# Patient Record
Sex: FEMALE | Race: WHITE | Employment: FULL TIME | URBAN - METROPOLITAN AREA
[De-identification: names, ages, dates, MRNs, and addresses within clinical notes are randomized per-mention and may not be internally consistent; named-entity substitution may affect disease eponyms.]

---

## 2019-07-08 ENCOUNTER — HOSPITAL ENCOUNTER (OUTPATIENT)
Age: 59
Setting detail: OUTPATIENT SURGERY
Discharge: HOME | End: 2019-07-08
Attending: INTERNAL MEDICINE | Admitting: INTERNAL MEDICINE
Payer: COMMERCIAL

## 2019-07-08 VITALS
RESPIRATION RATE: 16 BRPM | HEART RATE: 62 BPM | SYSTOLIC BLOOD PRESSURE: 103 MMHG | TEMPERATURE: 97.5 F | BODY MASS INDEX: 34.81 KG/M2 | DIASTOLIC BLOOD PRESSURE: 63 MMHG | WEIGHT: 196.5 LBS | OXYGEN SATURATION: 95 %

## 2019-07-08 LAB — GLUCOSE BLD STRIP.AUTO-MCNC: 192 MG/DL (ref 70–110)

## 2019-07-08 PROCEDURE — 77030020256 HC SOL INJ NACL 0.9%  500ML: Performed by: INTERNAL MEDICINE

## 2019-07-08 PROCEDURE — G0500 MOD SEDAT ENDO SERVICE >5YRS: HCPCS | Performed by: INTERNAL MEDICINE

## 2019-07-08 PROCEDURE — 99153 MOD SED SAME PHYS/QHP EA: CPT | Performed by: INTERNAL MEDICINE

## 2019-07-08 PROCEDURE — 74011250636 HC RX REV CODE- 250/636

## 2019-07-08 PROCEDURE — 82962 GLUCOSE BLOOD TEST: CPT

## 2019-07-08 PROCEDURE — 77030013991 HC SNR POLYP ENDOSC BSC -A: Performed by: INTERNAL MEDICINE

## 2019-07-08 PROCEDURE — 88305 TISSUE EXAM BY PATHOLOGIST: CPT

## 2019-07-08 PROCEDURE — 74011250636 HC RX REV CODE- 250/636: Performed by: INTERNAL MEDICINE

## 2019-07-08 PROCEDURE — 77030032490 HC SLV COMPR SCD KNE COVD -B: Performed by: INTERNAL MEDICINE

## 2019-07-08 PROCEDURE — 76040000007: Performed by: INTERNAL MEDICINE

## 2019-07-08 RX ORDER — SODIUM CHLORIDE 0.9 % (FLUSH) 0.9 %
5-40 SYRINGE (ML) INJECTION AS NEEDED
Status: DISCONTINUED | OUTPATIENT
Start: 2019-07-08 | End: 2019-07-08 | Stop reason: HOSPADM

## 2019-07-08 RX ORDER — LISINOPRIL 5 MG/1
TABLET ORAL DAILY
COMMUNITY

## 2019-07-08 RX ORDER — VARENICLINE TARTRATE 0.5 MG/1
0.5 TABLET, FILM COATED ORAL 2 TIMES DAILY
COMMUNITY

## 2019-07-08 RX ORDER — DEXTROMETHORPHAN/PSEUDOEPHED 2.5-7.5/.8
1.2 DROPS ORAL
Status: CANCELLED | OUTPATIENT
Start: 2019-07-08

## 2019-07-08 RX ORDER — SODIUM CHLORIDE 0.9 % (FLUSH) 0.9 %
5-40 SYRINGE (ML) INJECTION EVERY 8 HOURS
Status: DISCONTINUED | OUTPATIENT
Start: 2019-07-08 | End: 2019-07-08 | Stop reason: HOSPADM

## 2019-07-08 RX ORDER — FLUMAZENIL 0.1 MG/ML
0.2 INJECTION INTRAVENOUS
Status: DISCONTINUED | OUTPATIENT
Start: 2019-07-08 | End: 2019-07-08 | Stop reason: HOSPADM

## 2019-07-08 RX ORDER — DIPHENHYDRAMINE HYDROCHLORIDE 50 MG/ML
50 INJECTION, SOLUTION INTRAMUSCULAR; INTRAVENOUS ONCE
Status: DISCONTINUED | OUTPATIENT
Start: 2019-07-08 | End: 2019-07-08 | Stop reason: HOSPADM

## 2019-07-08 RX ORDER — FENTANYL CITRATE 50 UG/ML
100 INJECTION, SOLUTION INTRAMUSCULAR; INTRAVENOUS
Status: DISCONTINUED | OUTPATIENT
Start: 2019-07-08 | End: 2019-07-08 | Stop reason: HOSPADM

## 2019-07-08 RX ORDER — NALOXONE HYDROCHLORIDE 0.4 MG/ML
0.4 INJECTION, SOLUTION INTRAMUSCULAR; INTRAVENOUS; SUBCUTANEOUS
Status: CANCELLED | OUTPATIENT
Start: 2019-07-08 | End: 2019-07-08

## 2019-07-08 RX ORDER — ATROPINE SULFATE 0.1 MG/ML
0.5 INJECTION INTRAVENOUS
Status: CANCELLED | OUTPATIENT
Start: 2019-07-08 | End: 2019-07-09

## 2019-07-08 RX ORDER — MIDAZOLAM HYDROCHLORIDE 1 MG/ML
.25-5 INJECTION, SOLUTION INTRAMUSCULAR; INTRAVENOUS
Status: DISCONTINUED | OUTPATIENT
Start: 2019-07-08 | End: 2019-07-08 | Stop reason: HOSPADM

## 2019-07-08 RX ORDER — EPINEPHRINE 0.1 MG/ML
1 INJECTION INTRACARDIAC; INTRAVENOUS
Status: CANCELLED | OUTPATIENT
Start: 2019-07-08 | End: 2019-07-09

## 2019-07-08 RX ORDER — SODIUM CHLORIDE 9 MG/ML
1000 INJECTION, SOLUTION INTRAVENOUS CONTINUOUS
Status: DISCONTINUED | OUTPATIENT
Start: 2019-07-08 | End: 2019-07-08 | Stop reason: HOSPADM

## 2019-07-08 RX ADMIN — SODIUM CHLORIDE 1000 ML: 900 INJECTION, SOLUTION INTRAVENOUS at 08:34

## 2019-07-08 NOTE — H&P
This 59year old female presents for Colon Cancer Screening. History of Present Illness:  1. Colon Cancer Screening   Prior screening:  colonoscopy and 3/2009 Dr. Asad Díaz 0 polyps. Risk Factors: history of other malignancy, M-Breast, F-bladder and Bone. Additional information: No family history of colon cancer, Patient has a family history of Crohn's/colitis and No NSAID/ASA use. PROBLEM LIST:   Problem List reviewed. Problem Description Onset Date Chronic Clinical Status Notes   Breast lump 11/10/2014 N     High risk drug side effect 10/04/2012 Y     Type II diabetes mellitus uncontrolled 2014 Y     Pure hypercholesterolemia 2010 Y     Adjustment disorder 2010 Y     Tobacco dependence syndrome 2010 Y               PAST MEDICAL/SURGICAL HISTORY   (Detailed)    Disease/disorder Onset Date Management Date Comments     Breast biopsy       D&C           Family History  (Detailed)  Relationship Family Member Name  Age at Death Condition Onset Age Cause of Death       Family history of Hypertension  N       Family history of Heart disease  N   Family h/o    Kidney stones     Family h/o    Urolithiasis     Father  Y  Cancer, prostate  N   Maternal grandmother  N  Cancer, breast  N   Mother  N  Cancer, breast  N         Social History:  (Detailed)  Tobacco use reviewed. The patient is right-handed. Preferred language is [place]. Tobacco use status: Occasional cigarette smoker. Smoking status: Current every day smoker.     SMOKING STATUS  Use Status Type Smoking Status Usage Per Day Years Used Total Pack Years   yes Cigarette Current every day smoker 1 Packs 20.00 20.00     TOBACCO CESSATION INFORMATION  Date Counseled By Order Status Description Code Tobacco Cessation Information   2010 Sharon Thakkar Tobacco cessation counseling completed   Tobacco cessation counseling   2011 Sharon Thakkar Tobacco cessation counseling completed   Tobacco cessation counseling     ALCOHOL  There is a history of alcohol use. consumed rarely. CAFFEINE  The patient uses caffeine: coffee and soda. .            Medications (active prior to today)  Medication Instructions Start Date Stop Date Refilled Elsewhere   FreeStyle Lite Strips test daily by Subcutaneous route 03/08/2016 03/08/2016 N   cyclobenzaprine 5 mg tablet take 1 tablet by oral route 2 times every day as needed for back pain 12/13/2017 12/13/2017 N   Nicoderm CQ 21 mg/24 hr daily transdermal patch apply 1 patch by transdermal route  every day and remove at bedtime 07/17/2018 04/30/2019  N   nicotine (polacrilex) 2 mg gum chew 1 piece of gum by oral route  every 2 hours as needed and as directed 08/08/2018 04/30/2019  N   metformin ER 1,000 mg tablet,extended release 24hr take 1 tablet by oral route twice a day with  meals 11/07/2018   N   lisinopril 5 mg tablet TAKE 1 TABLET BY ORAL ROUTE EVERY DAY 11/07/2018 11/07/2018 N   aspirin 81 mg tablet,delayed release take 1 tablet by oral route  every day 11/07/2018   N   Accu-Chek Kayleigh Plus test strips test 1 x daily by Subcutaneous route 01/14/2019   N   Zoloft 100 mg tablet TAKE 1 TABLET (100MG) BY ORAL ROUTE EVERY DAY 01/23/2019 01/23/2019 N   ROSUVASTATIN CALCIUM 40 MG TAB TAKE 1 TABLET BY ORAL ROUTE EVERY DAY 01/23/2019 01/23/2019 N   Zantac 150 mg tablet TAKE 1 TABLET BY MOUTH 3 TIMES A DAY 01/31/2019 01/31/2019 N   Chantix Starting Month Box 0.5 mg (11)-1 mg (42) tablets in dose pack take as directed 04/12/2019   N   Proctosol HC 2.5 % topical cream perineal applicator apply by topical route 2 times every day to the affected area(s) 04/12/2019   N     Patient Status   Completed with information received for patient in a summary of care record. Medication Reconciliation  Medications reconciled today.   Medication Reviewed  Adherence Medication Name Sig Desc Elsewhere Status   taking as directed Zoloft 100 mg tablet TAKE 1 TABLET (100MG) BY ORAL ROUTE EVERY DAY N Verified   taking as directed Chantix Starting Month Box 0.5 mg (11)-1 mg (42) tablets in dose pack take as directed N Verified   taking as directed lisinopril 5 mg tablet TAKE 1 TABLET BY ORAL ROUTE EVERY DAY N Verified   taking as directed ROSUVASTATIN CALCIUM 40 MG TAB TAKE 1 TABLET BY ORAL ROUTE EVERY DAY N Verified   taking as directed cyclobenzaprine 5 mg tablet take 1 tablet by oral route 2 times every day as needed for back pain N Verified   taking as directed metformin ER 1,000 mg tablet,extended release 24hr take 1 tablet by oral route twice a day with  meals N Verified   taking as directed aspirin 81 mg tablet,delayed release take 1 tablet by oral route  every day N Verified   taking as directed Zantac 150 mg tablet TAKE 1 TABLET BY MOUTH 3 TIMES A DAY N Verified   taking as directed Proctosol HC 2.5 % topical cream perineal applicator apply by topical route 2 times every day to the affected area(s) N Verified   taking as directed GaviLyte-N 420 gram oral solution take as prescribed by physician N Verified   taking as directed Accu-Chek Kayleigh Plus test strips test 1 x daily by Subcutaneous route N Verified   taking as directed FreeStyle Lite Strips test daily by Subcutaneous route N Verified     Medications (Added, Continued or Stopped today)  Start Date Medication Directions PRN Status PRN Reason Instruction Stop Date   01/14/2019 Accu-Chek Kayleigh Plus test strips test 1 x daily by Subcutaneous route N  MAY USE ANY SIZE ACCU CHECK STRIPS FOR PTS GLUCOMETER    11/07/2018 aspirin 81 mg tablet,delayed release take 1 tablet by oral route  every day N      04/12/2019 Chantix Starting Month Box 0.5 mg (11)-1 mg (42) tablets in dose pack take as directed N      12/13/2017 cyclobenzaprine 5 mg tablet take 1 tablet by oral route 2 times every day as needed for back pain Y back pain     03/08/2016 FreeStyle Lite Strips test daily by Subcutaneous route N  Correct prescriber is Dr. Augie Metz    04/30/2019 GaviLyte-N 420 gram oral solution take as prescribed by physician N      11/07/2018 lisinopril 5 mg tablet TAKE 1 TABLET BY ORAL ROUTE EVERY DAY N      11/07/2018 metformin ER 1,000 mg tablet,extended release 24hr take 1 tablet by oral route twice a day with  meals N      07/17/2018 Nicoderm CQ 21 mg/24 hr daily transdermal patch apply 1 patch by transdermal route  every day and remove at bedtime N   04/30/2019 08/08/2018 nicotine (polacrilex) 2 mg gum chew 1 piece of gum by oral route  every 2 hours as needed and as directed N   04/30/2019 04/12/2019 Proctosol HC 2.5 % topical cream perineal applicator apply by topical route 2 times every day to the affected area(s) N      01/23/2019 ROSUVASTATIN CALCIUM 40 MG TAB TAKE 1 TABLET BY ORAL ROUTE EVERY DAY N      01/31/2019 Zantac 150 mg tablet TAKE 1 TABLET BY MOUTH 3 TIMES A DAY N      01/23/2019 Zoloft 100 mg tablet TAKE 1 TABLET (100MG) BY ORAL ROUTE EVERY DAY N        Allergies:  Ingredient Reaction (Severity) Medication Name Comment   AMOXICILLIN TRIHYDRATE   Amoxil   BUPROPION HCL   Wellbutrin   CODEINE      GENTAMICIN SULFATE   Garamycin   OMEPRAZOLE MAGNESIUM rash  Prilosec OTC   SULFA (SULFONAMIDE ANTIBIOTICS) rash     Reviewed, no changes. Review of Systems  System Neg/Pos Details   Constitutional Negative Chills, Fever, Malaise and Weight loss. ENMT Negative Nasal congestion and Sore throat. Eyes Negative Double vision. Respiratory Negative Asthma, Chronic cough and Wheezing. Cardio Negative Chest pain, Edema and Irregular heartbeat/palpitations. GI Positive Heartburn, Reflux. GI Negative Abdominal pain, Change in bowel habits, Constipation, Decreased appetite, Diarrhea, Dysphagia, Hematemesis, Hematochezia, Melena, Nausea and Vomiting.  Negative Dysuria and Hematuria.    Endocrine Negative Cold intolerance, Heat intolerance and Increased thirst.   Neuro Negative Dizziness, Headache, Numbness, Tremors and Vertigo. Psych Negative Anxiety, Depression and Increased stress. Integumentary Negative Hives and Rash. MS Negative Back pain, Joint pain and Myalgia. Hema/Lymph Negative Easy bleeding and Easy bruising. Allergic/Immuno Positive Seasonal allergies. Allergic/Immuno Negative Contact allergy and Food allergies. Vital Signs     Height  Time ft in cm Last Measured Height Position   1:20 PM 5.0 4.00 162.56 04/30/2019 Standing     Weight/BSA/BMI  Time lb oz kg Context BMI kg/m2 BSA m2   1:20 .00  89.811 dressed with shoes 33.99      Blood Pressure  Time BP mm/Hg Position Side Site Method Cuff Size   1:20 /70 sitting right arm automatic adult large     Temperature/Pulse/Respiration  Time Temp F Temp C Temp Site Pulse/min Pattern Resp/ min   1:20 PM    75 regular 18     Measured By  Time Measured by   1:20 PM [facility]         PHYSICAL EXAM:  Exam Findings Details   Constitutional Normal Well developed. Eyes Normal Conjunctiva - Right: Normal, Left: Normal. Sclera - Right: Normal, Left: Normal.   Nasopharynx Normal Lips/teeth/gums - Normal. Buccal mucosa - Normal.   Neck Exam Normal Inspection - Normal. Palpation - Normal. Thyroid gland - Normal.   Respiratory Normal Inspection - Normal. Auscultation - Normal.   Cardiovascular Normal Regular rate and rhythm. No murmurs, gallops, or rubs. Vascular Normal Pulses - Radial: Normal, Brachial: Normal, Dorsalis pedis: Normal, Posterior tibial: Normal.   Abdomen Normal Inspection - Normal. Appliance(s) - Normal. Abdominal muscles - Normal. Auscultation - Normal. Percussion - Normal. Anterior palpation - Normal, No guarding. Umbilicus - Normal. No abdominal tenderness. No hepatic enlargement. No spleen enlargement. No hernia. No Ascites. Beatty's sign - Negative. No hepatic tenderness. No hepatic bruit. Skin Normal Inspection - Normal.   Musculoskeletal Normal Hands/Wrist - Right: Normal, Left: Normal.   Extremity Normal No edema. Neurological Normal Fine motor skills - Normal.   Psychiatric Normal Orientation - Oriented to time, place, person & situation. Appropriate mood and affect. Assessment/Plan  # Detail Type Description    1. Assessment Encounter for screening for malignant neoplasm of skin (Z12.83). Patient Plan 59year old female patient of Dr. Gene Burnett for colonoscopy. Last colonoscopy completed 2009  by Dr. Lloyd Suarez. Impression and pathology revealed rectal hemorrhoids, a few small smooth polyps in sigmoid colon removed by cold snare polypectomy. BM once daily. Normal color, soft, formed in consistency. No evidence of blood or mucus, changes in bowel pattern or constipation issues. Patient reports seasonal and sulfa, codeine, PCN, and Gentamycin allergies but no herbal consumption. Medical hx includes DMII, HTN, hyperlipidemia, GERD, hemorrhoids. No significant  pulmonary , musculoskeletal issues. Surgical hx remarkable for appendectomy, cystectomy. No family history of colorectal CA. Reports 1-2PPD  tobacco and rare ETOH use. No significant weight gains or losses in the last 3-6 months. No heat or cold intolerances. Patient states  no N/V/D, fever, chills, sick contacts, SOB, abdominal or chest pains. No dysphagia, appetite is good which consists of 3 meals per day. PLAN: Colonoscopy Scheduled     She has average risk for colon cancer and is asymptomatic. She would be having her screening colonoscopy. I explained to her the procedure of colonoscopy and the risks involved which include but not limited to reaction to sedation, bleeding, perforation, infection or missing a lesion if bowels are not well prepped or are unusually tortuous. She agreed to proceed with the procedure and answered her questions. I gave her the [place] to clean her bowels. I advised her to take if needed extra laxatives for few days before in case she is on the constipated side to assure adequate bowel prep.    Told her not take her medications in the morning of the procedure because they would be flushed out with the prep but can take them more confidently after the procedure. I advised her to bring all her medication with her.      No change in h&P

## 2019-07-08 NOTE — PROCEDURES
[facility]  Colonoscopy Procedure Report  _______________________________________________________  Patient: Alana Eaton                                         Attending Physician: Joey Espinoza MD    Patient ID: [id number]                                      Referring Physician: Shantelle Bah DO    Exam Date: July 8, 2019 _______________________________________________________      Introduction: A  62 y.o. female patient, presents for outpatient Colonoscopy    Indications: patient of Dr. Prashant James for colonoscopy. Last colonoscopy in 2009  by my self: rectal hemorrhoids, a few small smooth polyps in sigmoid colon removed by cold snare polypectomy. BM once daily. Medical hx includes DMII, HTN, hyperlipidemia, GERD, hemorrhoids. Surgical hx remarkable for appendectomy, cystectomy. No family history of colorectal CA. Consent: The benefits, risks, and alternatives to the procedure were discussed and informed consent was obtained from the patient. Preparation: EKG, pulse, pulse oximetry and blood pressure were monitored throughout the procedure. ASA Classification: Class 2 - . The heart is an S1-S2 and regular heart rate and rhythm. Lungs are clear to auscultation and percussion. Abdomen is obese rounded soft, nondistended, and nontender. Mental Status: awake, alert, and oriented to person, place, and time    Medications:  · Fentanyl 100 mcg IV before procedure. · Versed 5 mg IV throughout the procedure. Rectal Exam: Small external hemorrhoids. Small rectocele. No Blood. Pathology Specimens: Two specimens removed. Procedure: The colonoscope was passed with ease through the anus under direct visualization and advanced to the cecum and 5 cm inside the terminal ileum. The patient required positioning on the back to aid in the passage of the scope. The scope was withdrawn and the mucosa was carefully examined. The quality of the preparation was excellent.  The views were excellent. The patient's toleration of the procedure was excellent. Retroflexion was preformed in the ascending colon and hepatic flexure. The exam was done twice to the cecum. Total time is 29 minutes and withdrawal time is 24 minutes. Findings:    Rectum:   Medium sized internal hemorrhoids  Sigmoid:   Slightly tortuous with mild sigmoid diverticulosis. 8 mm  Flat hyperplastic ? Polyp at 25 cm hot snared  Descending Colon:   Normal  Transverse Colon:   7 mm sessile polyp at the hepatic flexure cold snared  Ascending Colon:   4 mm sessile polyp at the hepatic flexure hot snared  Cecum:   Normal  Terminal Ileum:   Normal      Unplanned Events: There were no unplanned events. Estimated Blood Loss: None  Impressions:    Small external hemorrhoids. Medium sized internal hemorrhoids. Slightly tortuous with mild sigmoid diverticulosis. 8 mm  Flat hyperplastic ? Polyp at 25 cm hot snared. 7 mm sessile polyp at the hepatic flexure cold snared. 4 mm sessile polyp at the hepatic flexure hot snared. Normal Mucosa. Complications: None; patient tolerated the procedure well. Recommendations:  · Discharge home when standard parameters are met. · Resume a high fiber diet. · Colonoscopy recommendation in 5 years.     Procedure Codes:    · [facility] [[id number]]    Endoscope Information:  Model Number(s)    [id number]     Assistant: None      Signed By: Hiral Rich MD Date: July 8, 2019

## 2019-07-08 NOTE — DISCHARGE INSTRUCTIONS
Lennice Barthel  [id number]  1960    COLON DISCHARGE INSTRUCTIONS    Discomfort:  Redness at IV site- apply warm compress to area; if redness or soreness persist- contact your physician  There may be a slight amount of blood passed from the rectum  Gaseous discomfort- walking, belching will help relieve any discomfort  You may not operate a vehicle til the next day. You may not engage in an occupation involving machinery or appliances til the next day. You may not drink alcoholic beverages til the next day. DIET:   High fiber diet. ACTIVITY:  You may not  resume your normal daily activities til the next day. it is recommended that you spend the remainder of the day resting -  avoid any strenuous activity. CALL M.D.  IF ANY SIGN OF:   Increasing pain, nausea, vomiting  Abdominal distension (swelling)  New increased bleeding (oral or rectal)  Fever (chills)  Pain in chest area  Bloody discharge from nose or mouth  Shortness of breath    You may not  take any Advil, Aspirin, Ibuprofen, Motrin, Aleve, or Goodys for 5 days, ONLY  Tylenol as needed for pain. Post procedure diagnosis:  HEMORROIDS, POLYP IN TRANSVERSE, POLYP IN SIGMOID    Follow-up Instructions: Your follow up colonoscopy will be in 5 years. We will notify you the results of your biopsy by letter within 2 weeks.     Phuong Lorenz MD  July 8, 2019       DISCHARGE SUMMARY from Nurse    The following personal items collected during your admission are returned to you:   Dental Appliance:    Vision: Visual Aid: Glasses  Hearing Aid:    Jewelry:    Clothing:    Other Valuables:    Valuables sent to safe:              PATIENT INSTRUCTIONS:    After general anesthesia or intravenous sedation, for 24 hours or while taking prescription Narcotics:  · Limit your activities  · Do not drive and operate hazardous machinery  · Do not make important personal or business decisions  · Do  not drink alcoholic beverages  · If you have not urinated within 8 hours after discharge, please contact your surgeon on call. Report the following to your surgeon:  · Excessive pain, swelling, redness or odor of or around the surgical area  · Temperature over 100.5  · Nausea and vomiting lasting longer than 4 hours or if unable to take medications  · Any signs of decreased circulation or nerve impairment to extremity: change in color, persistent  numbness, tingling, coldness or increase pain  · Any questions      No orders of the defined types were placed in this encounter. What to do at Home:  Recommended activity: as above,     If you experience any of the following symptoms as above, please follow up with Dr. Trevon Murdock. *  Please give a list of your current medications to your Primary Care Provider. *  Please update this list whenever your medications are discontinued, doses are      changed, or new medications (including over-the-counter products) are added. *  Please carry medication information at all times in case of emergency situations. These are general instructions for a healthy lifestyle:    No smoking/ No tobacco products/ Avoid exposure to second hand smoke    Surgeon General's Warning:  Quitting smoking now greatly reduces serious risk to your health. Obesity, smoking, and sedentary lifestyle greatly increases your risk for illness    A healthy diet, regular physical exercise & weight monitoring are important for maintaining a healthy lifestyle    You may be retaining fluid if you have a history of heart failure or if you experience any of the following symptoms:  Weight gain of 3 pounds or more overnight or 5 pounds in a week, increased swelling in our hands or feet or shortness of breath while lying flat in bed. Please call your doctor as soon as you notice any of these symptoms; do not wait until your next office visit.     Recognize signs and symptoms of STROKE:    F-face looks uneven    A-arms unable to move or move unevenly    S-speech slurred or non-existent    T-time-call 911 as soon as signs and symptoms begin-DO NOT go       Back to bed or wait to see if you get better-TIME IS BRAIN. The discharge information has been reviewed with the patient and spouse. The patient and spouse verbalized understanding. Warning Signs of HEART ATTACK     Call 911 if you have these symptoms:   Chest discomfort. Most heart attacks involve discomfort in the center of the chest that lasts more than a few minutes, or that goes away and comes back. It can feel like uncomfortable pressure, squeezing, fullness, or pain.  Discomfort in other areas of the upper body. Symptoms can include pain or discomfort in one or both arms, the back, neck, jaw, or stomach.  Shortness of breath with or without chest discomfort.  Other signs may include breaking out in a cold sweat, nausea, or lightheadedness. Don't wait more than five minutes to call 911 - MINUTES MATTER! Fast action can save your life. Calling 911 is almost always the fastest way to get lifesaving treatment. Emergency Medical Services staff can begin treatment when they arrive -- up to an hour sooner than if someone gets to the hospital by car. The discharge information has been reviewed with the patient and caregiver. The patient and caregiver verbalized understanding. Discharge medications reviewed with the patient and guardian and appropriate educational materials and side effects teaching were provided.     Patient armband removed and shredded

## 2023-01-18 ENCOUNTER — HOSPITAL ENCOUNTER (OUTPATIENT)
Dept: LAB | Age: 63
Discharge: HOME | End: 2023-01-18
Payer: COMMERCIAL

## 2023-01-18 LAB
ALBUMIN SERPL-MCNC: 4.1 G/DL (ref 3.4–5)
ALBUMIN/GLOB SERPL: 1.2 (ref 0.8–1.7)
ALP SERPL-CCNC: 56 U/L (ref 45–117)
ALT SERPL-CCNC: 20 U/L (ref 13–56)
ANION GAP SERPL CALC-SCNC: 3 MMOL/L (ref 3–18)
AST SERPL-CCNC: 14 U/L (ref 10–38)
BASOPHILS # BLD: 0 K/UL (ref 0–0.1)
BASOPHILS NFR BLD: 1 % (ref 0–2)
BILIRUB SERPL-MCNC: 0.4 MG/DL (ref 0.2–1)
BUN SERPL-MCNC: 12 MG/DL (ref 7–18)
BUN/CREAT SERPL: 14 (ref 12–20)
CALCIUM SERPL-MCNC: 10.6 MG/DL (ref 8.5–10.1)
CHLORIDE SERPL-SCNC: 105 MMOL/L (ref 100–111)
CO2 SERPL-SCNC: 32 MMOL/L (ref 21–32)
CREAT SERPL-MCNC: 0.86 MG/DL (ref 0.6–1.3)
DIFFERENTIAL METHOD BLD: ABNORMAL
EOSINOPHIL # BLD: 0.1 K/UL (ref 0–0.4)
EOSINOPHIL NFR BLD: 2 % (ref 0–5)
ERYTHROCYTE [DISTWIDTH] IN BLOOD BY AUTOMATED COUNT: 13.3 % (ref 11.6–14.5)
GLOBULIN SER CALC-MCNC: 3.3 G/DL (ref 2–4)
GLUCOSE SERPL-MCNC: 83 MG/DL (ref 74–99)
HCT VFR BLD AUTO: 44.3 % (ref 35–45)
HGB BLD-MCNC: 14.5 G/DL (ref 12–16)
IMM GRANULOCYTES # BLD AUTO: 0 K/UL (ref 0–0.04)
IMM GRANULOCYTES NFR BLD AUTO: 0 % (ref 0–0.5)
INR PPP: 1 (ref 0.8–1.2)
LYMPHOCYTES # BLD: 1.4 K/UL (ref 0.9–3.6)
LYMPHOCYTES NFR BLD: 18 % (ref 21–52)
MCH RBC QN AUTO: 29.1 PG (ref 24–34)
MCHC RBC AUTO-ENTMCNC: 32.7 G/DL (ref 31–37)
MCV RBC AUTO: 88.8 FL (ref 78–100)
MONOCYTES # BLD: 0.4 K/UL (ref 0.05–1.2)
MONOCYTES NFR BLD: 6 % (ref 3–10)
NEUTS SEG # BLD: 5.7 K/UL (ref 1.8–8)
NEUTS SEG NFR BLD: 74 % (ref 40–73)
NRBC # BLD: 0 K/UL (ref 0–0.01)
NRBC BLD-RTO: 0 PER 100 WBC
PLATELET # BLD AUTO: 216 K/UL (ref 135–420)
PMV BLD AUTO: 10 FL (ref 9.2–11.8)
POTASSIUM SERPL-SCNC: 4.2 MMOL/L (ref 3.5–5.5)
PROT SERPL-MCNC: 7.4 G/DL (ref 6.4–8.2)
PROTHROMBIN TIME: 13.1 SEC (ref 11.5–15.2)
RBC # BLD AUTO: 4.99 M/UL (ref 4.2–5.3)
SODIUM SERPL-SCNC: 140 MMOL/L (ref 136–145)
WBC # BLD AUTO: 7.8 K/UL (ref 4.6–13.2)

## 2023-01-18 PROCEDURE — 36415 COLL VENOUS BLD VENIPUNCTURE: CPT

## 2023-01-18 PROCEDURE — 85610 PROTHROMBIN TIME: CPT

## 2023-01-18 PROCEDURE — 80053 COMPREHEN METABOLIC PANEL: CPT

## 2023-01-18 PROCEDURE — 85025 COMPLETE CBC W/AUTO DIFF WBC: CPT

## 2023-01-19 LAB
FAX TO INFO,FAXT: NORMAL
FAX TO NUMBER,FAXN: NORMAL

## 2023-01-26 RX ORDER — CYCLOSPORINE 0.5 MG/ML
1 EMULSION OPHTHALMIC EVERY 12 HOURS
Status: ON HOLD | COMMUNITY
End: 2023-01-27

## 2023-01-26 RX ORDER — CETIRIZINE HYDROCHLORIDE 10 MG/1
1 CAPSULE, LIQUID FILLED ORAL DAILY
COMMUNITY

## 2023-01-26 RX ORDER — PANTOPRAZOLE SODIUM 20 MG/1
20 TABLET, DELAYED RELEASE ORAL DAILY
Status: ON HOLD | COMMUNITY
End: 2023-01-27

## 2023-01-26 RX ORDER — FAMOTIDINE 40 MG/1
40 TABLET, FILM COATED ORAL DAILY
COMMUNITY

## 2023-01-26 RX ORDER — METOPROLOL TARTRATE 25 MG/1
12.5 TABLET, FILM COATED ORAL 2 TIMES DAILY
COMMUNITY
End: 2023-01-27

## 2023-01-26 RX ORDER — DULAGLUTIDE 0.75 MG/.5ML
0.75 INJECTION, SOLUTION SUBCUTANEOUS
COMMUNITY

## 2023-01-26 RX ORDER — RANOLAZINE 500 MG/1
TABLET, EXTENDED RELEASE ORAL 2 TIMES DAILY
COMMUNITY
End: 2023-01-27

## 2023-01-26 RX ORDER — ICOSAPENT ETHYL 1000 MG/1
1 CAPSULE ORAL 2 TIMES DAILY WITH MEALS
COMMUNITY

## 2023-01-26 RX ORDER — ASPIRIN 81 MG/1
81 TABLET ORAL DAILY
COMMUNITY

## 2023-01-26 RX ORDER — METHOCARBAMOL 500 MG/1
500 TABLET, FILM COATED ORAL
COMMUNITY

## 2023-01-27 ENCOUNTER — HOSPITAL ENCOUNTER (OUTPATIENT)
Age: 63
Setting detail: OUTPATIENT SURGERY
Discharge: HOME | End: 2023-01-27
Attending: INTERNAL MEDICINE | Admitting: INTERNAL MEDICINE
Payer: COMMERCIAL

## 2023-01-27 VITALS
HEIGHT: 63 IN | SYSTOLIC BLOOD PRESSURE: 107 MMHG | WEIGHT: 163 LBS | BODY MASS INDEX: 28.88 KG/M2 | OXYGEN SATURATION: 97 % | DIASTOLIC BLOOD PRESSURE: 57 MMHG | RESPIRATION RATE: 20 BRPM | TEMPERATURE: 99 F | HEART RATE: 68 BPM

## 2023-01-27 DIAGNOSIS — I20.9 ANGINA, CLASS III (HCC): ICD-10-CM

## 2023-01-27 DIAGNOSIS — R94.39 ABNORMAL STRESS TEST: ICD-10-CM

## 2023-01-27 LAB
GLUCOSE BLD STRIP.AUTO-MCNC: 111 MG/DL (ref 70–110)
GLUCOSE BLD STRIP.AUTO-MCNC: 51 MG/DL (ref 70–110)

## 2023-01-27 PROCEDURE — C1894 INTRO/SHEATH, NON-LASER: HCPCS | Performed by: INTERNAL MEDICINE

## 2023-01-27 PROCEDURE — 74011000250 HC RX REV CODE- 250: Performed by: INTERNAL MEDICINE

## 2023-01-27 PROCEDURE — 74011250637 HC RX REV CODE- 250/637: Performed by: INTERNAL MEDICINE

## 2023-01-27 PROCEDURE — 74011250636 HC RX REV CODE- 250/636: Performed by: INTERNAL MEDICINE

## 2023-01-27 PROCEDURE — 82962 GLUCOSE BLOOD TEST: CPT

## 2023-01-27 PROCEDURE — 77030013797 HC KT TRNSDUC PRSSR EDWD -A: Performed by: INTERNAL MEDICINE

## 2023-01-27 PROCEDURE — 77030040934 HC CATH DIAG DXTERITY MEDT -A: Performed by: INTERNAL MEDICINE

## 2023-01-27 PROCEDURE — 77030010221 HC SPLNT WR POS TELE -B: Performed by: INTERNAL MEDICINE

## 2023-01-27 PROCEDURE — 93458 L HRT ARTERY/VENTRICLE ANGIO: CPT | Performed by: INTERNAL MEDICINE

## 2023-01-27 PROCEDURE — 77030016699 HC CATH ANGI DX INFN1 CARD -A: Performed by: INTERNAL MEDICINE

## 2023-01-27 PROCEDURE — 74011000636 HC RX REV CODE- 636: Performed by: INTERNAL MEDICINE

## 2023-01-27 RX ORDER — SODIUM CHLORIDE 9 MG/ML
75 INJECTION, SOLUTION INTRAVENOUS CONTINUOUS
Status: DISCONTINUED | OUTPATIENT
Start: 2023-01-27 | End: 2023-01-27 | Stop reason: HOSPADM

## 2023-01-27 RX ORDER — METFORMIN HYDROCHLORIDE 1000 MG/1
1000 TABLET ORAL DAILY
Qty: 30 TABLET | Refills: 0 | Status: SHIPPED
Start: 2023-01-30

## 2023-01-27 RX ORDER — SODIUM CHLORIDE 0.9 % (FLUSH) 0.9 %
5-40 SYRINGE (ML) INJECTION EVERY 8 HOURS
Status: DISCONTINUED | OUTPATIENT
Start: 2023-01-27 | End: 2023-01-27 | Stop reason: HOSPADM

## 2023-01-27 RX ORDER — SODIUM CHLORIDE 0.9 % (FLUSH) 0.9 %
5-40 SYRINGE (ML) INJECTION AS NEEDED
Status: DISCONTINUED | OUTPATIENT
Start: 2023-01-27 | End: 2023-01-27 | Stop reason: HOSPADM

## 2023-01-27 RX ORDER — HEPARIN SODIUM 1000 [USP'U]/ML
INJECTION, SOLUTION INTRAVENOUS; SUBCUTANEOUS AS NEEDED
Status: DISCONTINUED | OUTPATIENT
Start: 2023-01-27 | End: 2023-01-27 | Stop reason: HOSPADM

## 2023-01-27 RX ORDER — HEPARIN SODIUM 200 [USP'U]/100ML
INJECTION, SOLUTION INTRAVENOUS
Status: COMPLETED | OUTPATIENT
Start: 2023-01-27 | End: 2023-01-27

## 2023-01-27 RX ORDER — VERAPAMIL HYDROCHLORIDE 2.5 MG/ML
INJECTION, SOLUTION INTRAVENOUS AS NEEDED
Status: DISCONTINUED | OUTPATIENT
Start: 2023-01-27 | End: 2023-01-27 | Stop reason: HOSPADM

## 2023-01-27 RX ORDER — FENTANYL CITRATE 50 UG/ML
INJECTION, SOLUTION INTRAMUSCULAR; INTRAVENOUS AS NEEDED
Status: DISCONTINUED | OUTPATIENT
Start: 2023-01-27 | End: 2023-01-27 | Stop reason: HOSPADM

## 2023-01-27 RX ORDER — THERA TABS 400 MCG
1 TAB ORAL DAILY
COMMUNITY

## 2023-01-27 RX ORDER — LIDOCAINE HYDROCHLORIDE 10 MG/ML
INJECTION INFILTRATION; PERINEURAL AS NEEDED
Status: DISCONTINUED | OUTPATIENT
Start: 2023-01-27 | End: 2023-01-27 | Stop reason: HOSPADM

## 2023-01-27 RX ORDER — GUAIFENESIN 100 MG/5ML
LIQUID (ML) ORAL AS NEEDED
Status: DISCONTINUED | OUTPATIENT
Start: 2023-01-27 | End: 2023-01-27 | Stop reason: HOSPADM

## 2023-01-27 RX ADMIN — SODIUM CHLORIDE 75 ML/HR: 9 INJECTION, SOLUTION INTRAVENOUS at 09:05

## 2023-01-27 NOTE — PROGRESS NOTES
Pt is all prepped and ready for procedure. 1000 Pt picked up for procedure to the Cath Lab    1100 Pt back to care unit S/P [facility]. Pt alert and awake and tolerated procedure well. TR band to left wrist with immobilizer in place. Site WNL. Precautions reinforced also patient and voiced understandings. 1200 Initiating removing air from TR band    1230 TR band removed and tolerated well. No bleeding nor hematoma noted to site. Sterile 2x2 with tegaderm dressing applied to site. 200 Dr Willie Barraza in to see patient. Results and follow up discussed. 1330 Discharge instructions reviewed with patient and caregiver and they verbalized all understandings. 1400 Pt escorted to car and left with family in stable condition.

## 2023-01-27 NOTE — DISCHARGE INSTRUCTIONS
Cardiac Catheterization/Angiography Discharge Instructions    *Check the puncture site frequently for swelling or bleeding. If you see any bleeding, lie down and apply pressure over the area with a clean town or washcloth. Notify your doctor for any redness, swelling, drainage or oozing from the puncture site. Notify your doctor for any fever or chills. *If the leg or arm with the puncture becomes cold, numb or painful, call Dr Amee Campbell     *Activity should be limited for the next 48 hours. Climb stairs as little as possible and avoid any stooping, bending or strenuous activity for 48 hours. No heavy lifting (anything over 10 pounds) for three days. *Do not drive for 48 hours. *You may resume your usual diet. Drink more fluids than usual.    *Have a responsible person drive you home and stay with you for at least 24 hours after your heart catheterization/angiography. *You may remove the bandage from your Left and Arm in 24 hours. You may shower in 24 hours. No tub baths, hot tubs or swimming for one week. Do not place any lotions, creams, powders, ointments over the puncture site for one week. You may place a clean band-aid over the puncture site each day for 5 days. Change this daily. DISCHARGE SUMMARY from Nurse    PATIENT INSTRUCTIONS:    After general anesthesia or intravenous sedation, for 24 hours or while taking prescription Narcotics:  Limit your activities  Do not drive and operate hazardous machinery  Do not make important personal or business decisions  Do  not drink alcoholic beverages  If you have not urinated within 8 hours after discharge, please contact your surgeon on call.     Report the following to your surgeon:  Excessive pain, swelling, redness or odor of or around the surgical area  Temperature over 100.5  Nausea and vomiting lasting longer than 4 hours or if unable to take medications  Any signs of decreased circulation or nerve impairment to extremity: change in color, persistent  numbness, tingling, coldness or increase pain  Any questions    What to do at Home:  Recommended activity: No lifting, Driving, or Strenuous exercise for one week. *  Please give a list of your current medications to your Primary Care Provider. *  Please update this list whenever your medications are discontinued, doses are      changed, or new medications (including over-the-counter products) are added. *  Please carry medication information at all times in case of emergency situations. These are general instructions for a healthy lifestyle:    No smoking/ No tobacco products/ Avoid exposure to second hand smoke  Surgeon General's Warning:  Quitting smoking now greatly reduces serious risk to your health. Obesity, smoking, and sedentary lifestyle greatly increases your risk for illness    A healthy diet, regular physical exercise & weight monitoring are important for maintaining a healthy lifestyle    You may be retaining fluid if you have a history of heart failure or if you experience any of the following symptoms:  Weight gain of 3 pounds or more overnight or 5 pounds in a week, increased swelling in our hands or feet or shortness of breath while lying flat in bed. Please call your doctor as soon as you notice any of these symptoms; do not wait until your next office visit. The discharge information has been reviewed with the patient and caregiver. The patient and caregiver verbalized understanding. Discharge medications reviewed with the patient and caregiver and appropriate educational materials and side effects teaching were provided.         Patient armband removed and shredded    ___________________________________________________________________________________________________________________________________

## 2023-01-27 NOTE — H&P
Date of Surgery Update:  Shaw Damon was seen and examined. History and physical has been reviewed. The patient has been examined. There have been no significant clinical changes since the completion of the originally dated History and Physical.      Patient assessed and is candidate for moderate sedation.       Signed By: Irma Ahmadi MD     January 27, 2023 10:22 AM

## 2023-01-27 NOTE — Clinical Note
TRANSFER - IN REPORT:     Verbal report received from: Daisy. Report consisted of patient's Situation, Background, Assessment and   Recommendations(SBAR). Opportunity for questions and clarification was provided. Assessment completed upon patient's arrival to unit and care assumed. Patient transported with a Registered Nurse.

## 2023-01-27 NOTE — PROGRESS NOTES
POC bloodsugar done from cap of IV set per protocol. Bs results 46. Pt states does not think that is right,  states feels fine,  POC blood sugar reschecked on left middle finger using fingerstick.   Repeat

## 2023-01-27 NOTE — DISCHARGE SUMMARY
Discharge Summary    Patient: Daina Castillo MRN: [id number]  CSN: [id number]    YOB: 1960  Age: 57 y.o. Sex: female    DOA: 1/27/2023 LOS:  LOS: 0 days   Discharge Date:      Primary Care Provider:  German Loredo DO    Admission Diagnoses: Angina, class III ([address] [I20.9]  Abnormal stress test [R94.39]    Discharge Diagnoses:   CAD    Discharge Condition: Stable    Discharge Medications:     Current Discharge Medication List        CONTINUE these medications which have CHANGED    Details   metFORMIN (GLUCOPHAGE) 1,000 mg tablet Take 1 Tablet by mouth daily. Indications: type 2 diabetes mellitus  Qty: 30 Tablet, Refills: 0  Start date: 1/30/2023           CONTINUE these medications which have NOT CHANGED    Details   therapeutic multivitamin (THERAGRAN) tablet Take 1 Tablet by mouth daily. aspirin delayed-release 81 mg tablet Take 81 mg by mouth daily. famotidine (PEPCID) 40 mg tablet Take 40 mg by mouth daily. dulaglutide (Trulicity) 9.72 DI/1.1 mL sub-q pen 0.75 mg by SubCUTAneous route every seven (7) days. icosapent ethyL (VASCEPA) 1 gram capsule Take 1 Capsule by mouth two (2) times daily (with meals). lisinopril (PRINIVIL, ZESTRIL) 5 mg tablet Take  by mouth daily. rosuvastatin (CRESTOR) 10 mg tablet Take 40 mg by mouth nightly. Indications: high cholesterol      sertraline (ZOLOFT) 100 mg tablet Take 100 mg by mouth nightly. cholecalciferol, vitamin D3, 2,000 unit tab Take 1 Tab by mouth nightly. methocarbamoL (ROBAXIN) 500 mg tablet Take 500 mg by mouth two (2) times daily as needed for Muscle Spasm(s). Cetirizine (ZyrTEC) 10 mg cap Take 1 Tablet by mouth daily.            STOP taking these medications       metoprolol tartrate (LOPRESSOR) 25 mg tablet Comments:   Reason for Stopping:         ranolazine ER (RANEXA) 500 mg SR tablet Comments:   Reason for Stopping:               Procedures :  left heart catheterization, coronary angiogram plus or minus PCI    Consults: None      PHYSICAL EXAM   Visit Vitals  /60 (BP 1 Location: Right upper arm, BP Patient Position: At rest;Supine)   Pulse 69   Temp 98.7 °F (37.1 °C)   Resp 13   Ht 5' 3\" (1.6 m)   Wt 73.9 kg (163 lb)   SpO2 92%   Breastfeeding No   BMI 28.87 kg/m²     General: Awake, cooperative, no acute distress    HEENT: NC, Atraumatic. PERRLA, EOMI. Anicteric sclerae. Lungs:  CTA Bilaterally. No Wheezing/Rhonchi/Rales. Heart:  Regular  rhythm,   3/6 systolic murmur in aortic area, No Rubs, No Gallops  Abdomen: Soft, Non distended, Non tender. +Bowel sounds,   Extremities: No c/c/e  Psych:   Not anxious or agitated. Neurologic:  No acute neurological deficits. Admission HPI :  see H&P    Hospital Course :  57-year-old female came for outpatient cardiac catheterization. Left heart catheterization and coronary angiogram done via left radial approach. Coronary angiogram revealed mild CAD. LVEDP was 9 mm hg.  Mild gradient on pullback. Patient tolerated procedure. Advised about medical management. Activity:   No driving for 24 hours, no weight lifting no more than 10 lb from left hand for 1 week    Diet: Cardiac Diet    Follow-up:  in cardiology clinic in 4-6 weeks    Disposition:  home    Minutes spent on discharge:  37 minutes       Labs: Results:       Chemistry No results for input(s): GLU, NA, K, CL, CO2, BUN, CREA, CA, AGAP, BUCR, TBIL, AP, TP, ALB, GLOB, AGRAT in the last 72 hours. No lab exists for component: GPT   CBC w/Diff No results for input(s): WBC, RBC, HGB, HCT, PLT, GRANS, LYMPH, EOS, HGBEXT, HCTEXT, PLTEXT in the last 72 hours. Cardiac Enzymes No results for input(s): CPK, CKND1, CHICO in the last 72 hours. No lab exists for component: CKRMB, TROIP   Coagulation No results for input(s): PTP, INR, APTT, INREXT in the last 72 hours.     Lipid Panel No results found for: CHOL, CHOLPOCT, CHOLX, CHLST, CHOLV, 269956, HDL, HDLP, LDL, LDLC, DLDLP, 389832, VLDLC, VLDL, TGLX, TRIGL, TRIGP, TGLPOCT, CHHD, CHHDX   BNP No results for input(s): BNPP in the last 72 hours. Liver Enzymes No results for input(s): TP, ALB, TBIL, AP in the last 72 hours. No lab exists for component: SGOT, GPT, DBIL   Thyroid Studies No results found for: T4, T3U, TSH, TSHEXT         Significant Diagnostic Studies: No results found.           CC: Tori Dorantes, DO

## 2023-01-27 NOTE — Clinical Note
TRANSFER - OUT REPORT:     Verbal report given to: [place]. Report consisted of patient's Situation, Background, Assessment and   Recommendations(SBAR). Opportunity for questions and clarification was provided. Patient transported with a Registered Nurse. Patient transported to: care unit.

## 2023-01-27 NOTE — Clinical Note
Contrast Dose Calculator:   Patient's age: 57.   Patient's sex: Female. Patient weight (kg) = 73.9. Creatinine level (mg/dL) = 0.86. Creatinine clearance (mL/min): 77.1. Max Contrast dose per Creatinine Cl calculator = 173.47 mL.

## 2023-01-27 NOTE — PROCEDURES
LHC and Coronary angiogram done via left radial approach. Coronary angiogram revealed mild CAD    LV gram was not done. LVEDP 9 mm hg. Mild gradient on pull back. Patient tolerated procedure well. Scant blood loss. No complications. No specimen removed. Recommendation:    Medication considered:   Aspirin, beta blocker, ACEI, Nitrates and statin     CAD risk factor education  Diet education  Control cholesterol  Blood pressure control  Exercise education: Age and functional status appropriate. Consider evaluation for other sources of reported symptoms.

## 2025-03-11 ENCOUNTER — HOSPITAL ENCOUNTER (OUTPATIENT)
Facility: HOSPITAL | Age: 65
Setting detail: OUTPATIENT SURGERY
Discharge: HOME | End: 2025-03-11
Attending: INTERNAL MEDICINE | Admitting: INTERNAL MEDICINE
Payer: MEDICARE

## 2025-03-11 VITALS
TEMPERATURE: 97.9 F | SYSTOLIC BLOOD PRESSURE: 123 MMHG | HEART RATE: 64 BPM | HEIGHT: 63 IN | RESPIRATION RATE: 16 BRPM | DIASTOLIC BLOOD PRESSURE: 63 MMHG | OXYGEN SATURATION: 100 % | BODY MASS INDEX: 27.55 KG/M2 | WEIGHT: 155.5 LBS

## 2025-03-11 LAB — GLUCOSE BLD STRIP.AUTO-MCNC: 107 MG/DL (ref 70–110)

## 2025-03-11 PROCEDURE — 7100000011 HC PHASE II RECOVERY - ADDTL 15 MIN: Performed by: INTERNAL MEDICINE

## 2025-03-11 PROCEDURE — 99153 MOD SED SAME PHYS/QHP EA: CPT | Performed by: INTERNAL MEDICINE

## 2025-03-11 PROCEDURE — 88305 TISSUE EXAM BY PATHOLOGIST: CPT

## 2025-03-11 PROCEDURE — 3600007502: Performed by: INTERNAL MEDICINE

## 2025-03-11 PROCEDURE — 6360000002 HC RX W HCPCS: Performed by: INTERNAL MEDICINE

## 2025-03-11 PROCEDURE — 7100000010 HC PHASE II RECOVERY - FIRST 15 MIN: Performed by: INTERNAL MEDICINE

## 2025-03-11 PROCEDURE — 3600007512: Performed by: INTERNAL MEDICINE

## 2025-03-11 PROCEDURE — 2709999900 HC NON-CHARGEABLE SUPPLY: Performed by: INTERNAL MEDICINE

## 2025-03-11 PROCEDURE — 82962 GLUCOSE BLOOD TEST: CPT

## 2025-03-11 PROCEDURE — 99152 MOD SED SAME PHYS/QHP 5/>YRS: CPT | Performed by: INTERNAL MEDICINE

## 2025-03-11 PROCEDURE — 2580000003 HC RX 258: Performed by: INTERNAL MEDICINE

## 2025-03-11 RX ORDER — LISINOPRIL 5 MG/1
5 TABLET ORAL DAILY
COMMUNITY

## 2025-03-11 RX ORDER — DULAGLUTIDE 0.75 MG/.5ML
INJECTION, SOLUTION SUBCUTANEOUS
COMMUNITY

## 2025-03-11 RX ORDER — SERTRALINE HYDROCHLORIDE 25 MG/1
25 TABLET, FILM COATED ORAL EVERY MORNING
COMMUNITY
Start: 2025-01-02

## 2025-03-11 RX ORDER — METHOCARBAMOL 500 MG/1
500 TABLET, FILM COATED ORAL 2 TIMES DAILY PRN
COMMUNITY

## 2025-03-11 RX ORDER — SERTRALINE HYDROCHLORIDE 100 MG/1
100 TABLET, FILM COATED ORAL EVERY EVENING
COMMUNITY

## 2025-03-11 RX ORDER — FENTANYL CITRATE 50 UG/ML
INJECTION, SOLUTION INTRAMUSCULAR; INTRAVENOUS PRN
Status: DISCONTINUED | OUTPATIENT
Start: 2025-03-11 | End: 2025-03-11 | Stop reason: ALTCHOICE

## 2025-03-11 RX ORDER — FAMOTIDINE 40 MG/1
40 TABLET, FILM COATED ORAL
COMMUNITY

## 2025-03-11 RX ORDER — SODIUM CHLORIDE 9 MG/ML
INJECTION, SOLUTION INTRAVENOUS CONTINUOUS
Status: DISCONTINUED | OUTPATIENT
Start: 2025-03-11 | End: 2025-03-11 | Stop reason: HOSPADM

## 2025-03-11 RX ORDER — MIDAZOLAM HYDROCHLORIDE 1 MG/ML
INJECTION, SOLUTION INTRAMUSCULAR; INTRAVENOUS PRN
Status: DISCONTINUED | OUTPATIENT
Start: 2025-03-11 | End: 2025-03-11 | Stop reason: ALTCHOICE

## 2025-03-11 RX ORDER — ROSUVASTATIN CALCIUM 40 MG/1
40 TABLET, COATED ORAL DAILY
COMMUNITY

## 2025-03-11 RX ORDER — ASPIRIN 81 MG/1
81 TABLET ORAL DAILY
COMMUNITY

## 2025-03-11 RX ADMIN — SODIUM CHLORIDE: 0.9 INJECTION, SOLUTION INTRAVENOUS at 11:10

## 2025-03-11 ASSESSMENT — PAIN - FUNCTIONAL ASSESSMENT
PAIN_FUNCTIONAL_ASSESSMENT: ADULT NONVERBAL PAIN SCALE (NPVS)
PAIN_FUNCTIONAL_ASSESSMENT: ADULT NONVERBAL PAIN SCALE (NPVS)
PAIN_FUNCTIONAL_ASSESSMENT: 0-10
PAIN_FUNCTIONAL_ASSESSMENT: NONE - DENIES PAIN
PAIN_FUNCTIONAL_ASSESSMENT: ADULT NONVERBAL PAIN SCALE (NPVS)
PAIN_FUNCTIONAL_ASSESSMENT: NONE - DENIES PAIN

## 2025-03-11 NOTE — PRE SEDATION
Sedation Pre-Procedure Note    Patient Name: Anum Mg   YOB: 1960  Room/Bed: ENDO/PL  Medical Record Number: [id number]  Date: 3/11/2025   Time: 12:39 PM       Indication:  hx colon poly    Consent: I have discussed with the patient and/or the patient representative the indication, alternatives, and the possible risks and/or complications of the planned procedure and the anesthesia methods. The patient and/or patient representative appear to understand and agree to proceed.    Vital Signs:   Vitals:    03/11/25 1108   BP: 120/67   Pulse: 77   Resp: 16   Temp: 98.9 °F (37.2 °C)   SpO2: 96%       Past Medical History:   has a past medical history of Arthritis, Diabetes (HCC), GERD (gastroesophageal reflux disease), Hyperparathyroidism, Hypertension, Kidney stones, Nausea & vomiting, Psychiatric disorder, and Sleep apnea.    Past Surgical History:   has a past surgical history that includes Appendectomy; heent (Left); Breast surgery (Bilateral); Colonoscopy (N/A, 7/8/2019); and Knee cartilage surgery (Right).    Medications:   Scheduled Meds:   Continuous Infusions:    sodium chloride 100 mL/hr at 03/11/25 1110     PRN Meds:   Home Meds:   Prior to Admission medications    Medication Sig Start Date End Date Taking? Authorizing Provider   aspirin 81 MG EC tablet Take 1 tablet by mouth daily   Yes Felicity Espinoza MD   metFORMIN (GLUCOPHAGE) 1000 MG tablet take 1 tablet by mouth once a day with breakfast 1/2/25  Yes Felicity Espinoza MD   methocarbamol (ROBAXIN) 500 MG tablet Take 1 tablet by mouth 2 times daily as needed   Yes Felicity Espinoza MD   sertraline (ZOLOFT) 25 MG tablet Take 1 tablet by mouth every morning 1/2/25  Yes Felicity Espinoza MD   sertraline (ZOLOFT) 100 MG tablet Take 1 tablet by mouth every evening   Yes Felicity Espinoza MD   Cholecalciferol 50 MCG (2000 UT) TABS Take 1 tablet by mouth nightly    Felicity Espinoza MD   TRULICITY 0.75 MG/0.5ML SOAJ SC

## 2025-03-11 NOTE — DISCHARGE INSTRUCTIONS
Anum GRAHAM Saurav  [id number]  1960    COLON DISCHARGE INSTRUCTIONS    Discomfort:  Redness at IV site- apply warm compress to area; if redness or soreness persist- contact your physician  There may be a slight amount of blood passed from the rectum  Gaseous discomfort- walking, belching will help relieve any discomfort  You may not operate a vehicle til the next day.  You may not engage in an occupation involving machinery or appliances til the next day.  You may not drink alcoholic beverages til the next day.    DIET:   High fiber diet.     ACTIVITY:  You may not  resume your normal daily activities til the next day. it is recommended that you spend the remainder of the day resting -  avoid any strenuous activity.    CALL M.D.  IF ANY SIGN OF:   Increasing pain, nausea, vomiting  Abdominal distension (swelling)  New increased bleeding (oral or rectal)  Fever (chills)  Pain in chest area  Bloody discharge from nose or mouth  Shortness of breath    You may not  take any Advil, Aspirin, Ibuprofen, Motrin, Aleve, or Goody’s for 5 days, ONLY  Tylenol as needed for pain.    Post procedure diagnosis:  Small anal skin tags, Small internal hemorrhoids, 6 mm sessile flattened polyp in the very distal posterior rectum cold snared. Tortuous sigmoid with moderate sigmoid diverticulosis and muscular hypertonia. 3 mm sessile polyp in the proximal sigmoid at 36 cm, cold snared. One medium diverticulum in the proximal ascending colon.    Follow-up Instructions:   Your follow up colonoscopy will be in 10 years.    We will notify you the results of your biopsy by letter within 2 weeks.    Maegan Ortiz MD  March 11, 2025    DISCHARGE SUMMARY from Nurse    PATIENT INSTRUCTIONS:    After general anesthesia or intravenous sedation, for 24 hours or while taking prescription Narcotics:  Limit your activities  Do not drive and operate hazardous machinery  Do not make important personal or business decisions  Do  not drink alcoholic

## 2025-03-11 NOTE — H&P
Assessment/Plan  # Detail Type Description    1. Assessment Personal history of colonic polyps (Z86.010).    Impression Pt of Dr. Calloway, here for 5yr Recall, for surveillance of colonic adenomas.  _________________________________  *Last colonoscopy (@58yo) was done 7/8/2019 by Dr. Ortiz @[facility]:  Small external hemorrhoids. Medium-sized internal hemorrhoids. Slightly tortuous with mild sigmoid diverticulosis. 8mm flat hyperplastic? polyp at 25cm, hot snared (Hyperplastic). 7mm sessile polyp at the hepatic flexure, cold snared (Tubular Adenoma). 4mm sessile polyp at the hepatic flexure, hot snared (Tubular Adenoma). Normal mucosa. 5yr Recall Recommended.  _________________________________  Average risk, no FHx of colon cancer. Asymptomatic of current GI complaints.   BMI: 25.9, BM: 1x daily. Hx of hemorrhoids and occasional rectal bleeding.    Patient Plan *C-scope Plan:  Colonoscopy ordered with Dr. Ortiz with Golytely/Gavilyte bowel prep, and Miralax and stool softeners starting 3 days before prep.  -Patient is advised that they should take their aspirin (if prescribed) up until the day of procedure.  -Patient is advised to take Thyroid meds, BP meds, beta blockers, and any cardiac meds the AM of procedure with sip clear liquid after prep.  -Diabetic medication instructions (See Below).     *C-scope Risks:  Stressed importance of following all bowel preparation instructions. Explained the procedure to the patient including all risks and benefits.  These risks consist of missed lesions on exam, bleeding, and bowel perforation with possible need for admission to the hospital, and in the most extensive of  circumstances, the patient may require surgery. Pt verbalized understanding of these risks and is agreeable with this procedure.    Plan Orders DIAGNOSTIC COLONOSCOPY to be performed.         2. Assessment Nonrheumatic aortic (valve) stenosis (I35.0).    Impression Lexiscan done 1/9/2023, EF: 68% ( 
stated

## 2025-03-11 NOTE — PROCEDURES
[facility]  Colonoscopy Procedure Report  _______________________________________________________  Patient: Anum Mg                                        Attending Physician: KEN Ortiz MD    Patient ID: [id number]                                    Referring Physician: Magda Calloway DO    Exam Date: 3/11/2025     Introduction: A  64 y.o. female patient, presents for inpatient Colonoscopy    Indications: 63 yo female a Pt of Dr. Calloway, here for 5yr Recall, for surveillance of colonic adenomas.  Last colonoscopy (@58yo) was done 7/8/2019 by Dr. Ortiz @[facility]:  Small external hemorrhoids. Medium-sized internal hemorrhoids. Slightly tortuous with mild sigmoid diverticulosis. 8mm flat hyperplastic? polyp at 25cm, hot snared (Hyperplastic). 7mm sessile polyp at the hepatic flexure, cold snared (Tubular Adenoma). 4mm sessile polyp at the hepatic flexure, hot snared (Tubular Adenoma). Normal mucosa. 5yr Recall Recommended.  No FHx of colon cancer. Asymptomatic of current GI complaints.   BMI: 25.9, BM: 1 every 2 days. Hx of hemorrhoids and occasional rectal bleeding    Consent: The benefits, risks, and alternatives to the procedure were discussed and informed consent was obtained from the patient.    Preparation: EKG, pulse, pulse oximetry and blood pressure were monitored throughout the procedure. ASA Classification: Class II- . The heart is an S1-S2 and regular heart rate and rhythm. Lungs are clear to auscultation and percussion. Abdomen is soft, nondistended, and nontender. Mental Status: awake, alert, and oriented to person, place, and time    Medications:  Fentanyl 100 mcg IV before procedure.  Versed 3 mg IV throughout the procedure.    Rectal Exam: Normal Rectal Exam. No Blood.     Pathology Specimens:  1    Procedure:  The colonoscope was passed with mild difficulty through the anus under direct visualization and advanced to the cecum and 3 cm inside the terminal ileum.  The

## (undated) DEVICE — KENDALL RADIOLUCENT FOAM MONITORING ELECTRODE RECTANGULAR SHAPE: Brand: KENDALL

## (undated) DEVICE — SYRINGE 50ML E/T

## (undated) DEVICE — GAUZE SPNG AVANT 4X4 4PLY NS --

## (undated) DEVICE — SHIELD RAD 14X16 IN W/ SCOOP ABSORBER

## (undated) DEVICE — SYR 5ML 1/5 GRAD LL NSAF LF --

## (undated) DEVICE — CATH SUC CTRL PRT TRIFLO 14FR --

## (undated) DEVICE — CATHETER PH SUCT 14FR

## (undated) DEVICE — SINGLE PORT MANIFOLD: Brand: NEPTUNE 2

## (undated) DEVICE — SENSOR PLSE OXMTR AD CBL L36IN ADH FRM FIT SPO2 DISP

## (undated) DEVICE — CATHETER DIAG AD 5FR L110CM 145DEG COR GRY HYDRPHLC NYL

## (undated) DEVICE — CANNULA CUSH AD W/ 14FT TBG

## (undated) DEVICE — SNARE POLYP SM W13MMXL240CM SHTH DIA2.4MM OVL FLX DISP

## (undated) DEVICE — TUBING, SUCTION, 1/4" X 12', STRAIGHT: Brand: MEDLINE

## (undated) DEVICE — SOLUTION IV 1000 ML 0.9 NACL INJ USP EXCEL PLAS CONTAINER

## (undated) DEVICE — PRESSURE MONITORING SET: Brand: TRUWAVE

## (undated) DEVICE — KENDALL SCD EXPRESS SLEEVES, KNEE LENGTH, MEDIUM: Brand: KENDALL SCD

## (undated) DEVICE — TRAP SPEC COLL POLYP POLYSTYR --

## (undated) DEVICE — CATH DIAG F5 JR 3.5 100CM -- INFINITI - ORDER AS EA

## (undated) DEVICE — WRISTBAND ID AD W2.5XL9.5CM RED VYN ADH CLSR UNI-PRINT

## (undated) DEVICE — CATHETER IV 22GA L1IN BLU POLYUR STR HUB RADPQ PROTCT +

## (undated) DEVICE — SPLINT WR POS F/ARTERIAL ACC -- BX/10

## (undated) DEVICE — Device

## (undated) DEVICE — DRAPE,ANGIO,BRACH,STERILE,38X44: Brand: MEDLINE

## (undated) DEVICE — CATHETER DIAG 5FR L100CM LUMN ID0.047IN JL3.5 CRV 0 SIDE H

## (undated) DEVICE — MAJ-1414 SINGLE USE ADPATER BIOPSY VALV: Brand: SINGLE USE ADAPTOR BIOPSY VALVE

## (undated) DEVICE — NDL PRT INJ NSAF BLNT 18GX1.5 --

## (undated) DEVICE — SYR 3ML LL TIP 1/10ML GRAD --

## (undated) DEVICE — STOPCOCK TRNSDUC 500PSI 3 W ROT M LUER LT BLU OFF HNDL R

## (undated) DEVICE — ENDO CARRY-ON PROCEDURE KIT INCLUDES ENZYMATIC SPONGE, GAUZE, BIOHAZARD LABEL, TRAY, LUBRICANT, DIRTY SCOPE LABEL, WATER LABEL, TRAY, DRAWSTRING PAD, AND DEFENDO 4-PIECE KIT.: Brand: ENDO CARRY-ON PROCEDURE KIT

## (undated) DEVICE — SPONGE GZ W4XL4IN COT 12 PLY TYP VII WVN C FLD DSGN

## (undated) DEVICE — NDL FLTR TIP 5 MIC 18GX1.5IN --

## (undated) DEVICE — CATH IV SAFE STR 22GX1IN BLU -- PROTECTIV PLUS

## (undated) DEVICE — Device: Brand: SINGLE USE ELECTROSURGICAL SNARE SD-400

## (undated) DEVICE — SET ADMIN 16ML TBNG L100IN 2 Y INJ SITE IV PIGGY BK DISP

## (undated) DEVICE — SYRINGE MEDICAL 3ML CLEAR PLASTIC STANDARD NON CONTROL LUERLOCK TIP DISPOSABLE

## (undated) DEVICE — TRAP SPEC POLYP REM STRNR CLN DSGN MAGNIFYING WIND DISP

## (undated) DEVICE — PACK PROCEDURE SURG CATH CUST

## (undated) DEVICE — BLUNTFILL: Brand: MONOJECT

## (undated) DEVICE — TRNQT TEXT 1X18IN BLU LF DISP -- CONVERT TO ITEM 362165

## (undated) DEVICE — ERBE NESSY®PLATE 170 SPLIT; 168CM²; CABLE 3M: Brand: ERBE

## (undated) DEVICE — GLIDESHEATH SLENDER STAINLESS STEEL KIT: Brand: GLIDESHEATH SLENDER

## (undated) DEVICE — SYRINGE MED 5ML STD CLR PLAS LUERLOCK TIP N CTRL DISP

## (undated) DEVICE — TOURNIQUET PHLEB W1XL18IN BLU FLAT RL AND BND REUSE FOR IV

## (undated) DEVICE — MEDI-VAC NON-CONDUCTIVE SUCTION TUBING: Brand: CARDINAL HEALTH

## (undated) DEVICE — SOLUTION IV 500ML 0.9% SOD CHL FLX CONT